# Patient Record
Sex: FEMALE | URBAN - METROPOLITAN AREA
[De-identification: names, ages, dates, MRNs, and addresses within clinical notes are randomized per-mention and may not be internally consistent; named-entity substitution may affect disease eponyms.]

---

## 2022-03-09 ENCOUNTER — APPOINTMENT (RX ONLY)
Dept: URBAN - METROPOLITAN AREA CLINIC 303 | Facility: CLINIC | Age: 62
Setting detail: DERMATOLOGY
End: 2022-03-09

## 2022-03-09 DIAGNOSIS — L40.0 PSORIASIS VULGARIS: ICD-10-CM

## 2022-03-09 PROCEDURE — ? PRESCRIPTION

## 2022-03-09 PROCEDURE — ? COUNSELING

## 2022-03-09 PROCEDURE — ? SCREENING FOR COVID-19

## 2022-03-09 PROCEDURE — ? PRESCRIPTION MEDICATION MANAGEMENT

## 2022-03-09 PROCEDURE — 99204 OFFICE O/P NEW MOD 45 MIN: CPT

## 2022-03-09 PROCEDURE — ? ADDITIONAL NOTES

## 2022-03-09 RX ORDER — CLOBETASOL PROPIONATE 0.5 MG/G
OINTMENT TOPICAL
Qty: 60 | Refills: 6 | Status: ERX | COMMUNITY
Start: 2022-03-09

## 2022-03-09 RX ADMIN — CLOBETASOL PROPIONATE: 0.5 OINTMENT TOPICAL at 00:00

## 2022-03-09 ASSESSMENT — LOCATION SIMPLE DESCRIPTION DERM
LOCATION SIMPLE: RIGHT HAND
LOCATION SIMPLE: POSTERIOR NECK
LOCATION SIMPLE: RIGHT THIGH

## 2022-03-09 ASSESSMENT — LOCATION ZONE DERM
LOCATION ZONE: LEG
LOCATION ZONE: NECK
LOCATION ZONE: HAND

## 2022-03-09 ASSESSMENT — LOCATION DETAILED DESCRIPTION DERM
LOCATION DETAILED: RIGHT ANTERIOR PROXIMAL THIGH
LOCATION DETAILED: MID POSTERIOR NECK
LOCATION DETAILED: RIGHT ULNAR PALM

## 2022-03-09 ASSESSMENT — BSA PSORIASIS: % BODY COVERED IN PSORIASIS: 10

## 2022-03-09 ASSESSMENT — PGA PSORIASIS: PGA PSORIASIS 2020: MODERATE

## 2022-03-09 NOTE — PROCEDURE: SCREENING FOR COVID-19
Has The Patient Received Both Doses Of The Vaccine (If Applicable)?: Yes
Which Covid 19 Vaccine Did Patient Receive (Optional)?: Moderna
Detail Level: Simple
Has The Patient Been Infected With Covid-19 In The Past?: No
Previous Infection Text: The patient has recovered from a previous COVID-19 infection.

## 2022-03-09 NOTE — PROCEDURE: ADDITIONAL NOTES
Render Risk Assessment In Note?: no
Detail Level: Detailed
Additional Notes: Follow up if uncontrolled or spreads if need to consider systemic tmt

## 2022-03-09 NOTE — PROCEDURE: PRESCRIPTION MEDICATION MANAGEMENT
Initiate Treatment: Clobetasol 0.05% topical ointment
Detail Level: Zone
Plan: Follow up if continues to worsen
Render In Strict Bullet Format?: No

## 2023-05-18 ENCOUNTER — RX ONLY (OUTPATIENT)
Age: 63
Setting detail: RX ONLY
End: 2023-05-18

## 2023-05-18 RX ORDER — CLOBETASOL PROPIONATE 0.5 MG/G
OINTMENT TOPICAL
Qty: 45 | Refills: 6 | Status: ERX